# Patient Record
Sex: FEMALE | Employment: FULL TIME | URBAN - METROPOLITAN AREA
[De-identification: names, ages, dates, MRNs, and addresses within clinical notes are randomized per-mention and may not be internally consistent; named-entity substitution may affect disease eponyms.]

---

## 2024-06-18 ENCOUNTER — OFFICE VISIT (OUTPATIENT)
Dept: SPORTS MEDICINE | Age: 49
End: 2024-06-18

## 2024-06-18 ENCOUNTER — APPOINTMENT (OUTPATIENT)
Dept: SPORTS MEDICINE | Age: 49
End: 2024-06-18

## 2024-06-18 VITALS — HEART RATE: 75 BPM | SYSTOLIC BLOOD PRESSURE: 128 MMHG | DIASTOLIC BLOOD PRESSURE: 82 MMHG

## 2024-06-18 DIAGNOSIS — M76.892 PES ANSERINUS TENDONITIS OF LEFT LOWER EXTREMITY: Primary | ICD-10-CM

## 2024-06-18 PROCEDURE — 99204 OFFICE O/P NEW MOD 45 MIN: CPT | Performed by: FAMILY MEDICINE

## 2024-06-18 RX ORDER — NAPROXEN 500 MG/1
TABLET ORAL
Qty: 60 TABLET | Refills: 0 | Status: SHIPPED | OUTPATIENT
Start: 2024-06-18

## 2024-06-18 RX ORDER — ESTRADIOL 0.03 MG/D
1 FILM, EXTENDED RELEASE TRANSDERMAL
COMMUNITY

## 2024-06-18 ASSESSMENT — ENCOUNTER SYMPTOMS
NAUSEA: 0
CHILLS: 0
DIZZINESS: 0
CONFUSION: 0
COLOR CHANGE: 0
FEVER: 0
STRIDOR: 0
DIARRHEA: 0
SPEECH DIFFICULTY: 0
ABDOMINAL PAIN: 0
VOMITING: 0
CHEST TIGHTNESS: 0
AGITATION: 0
TROUBLE SWALLOWING: 0
FACIAL ASYMMETRY: 0
EYE DISCHARGE: 0
EYE REDNESS: 0
SHORTNESS OF BREATH: 0

## 2024-06-20 ENCOUNTER — EXTERNAL RECORD (OUTPATIENT)
Dept: HEALTH INFORMATION MANAGEMENT | Facility: OTHER | Age: 49
End: 2024-06-20

## 2024-09-01 ENCOUNTER — HOSPITAL ENCOUNTER (EMERGENCY)
Facility: HOSPITAL | Age: 49
Discharge: HOME | End: 2024-09-01
Attending: EMERGENCY MEDICINE
Payer: COMMERCIAL

## 2024-09-01 VITALS
OXYGEN SATURATION: 100 % | RESPIRATION RATE: 11 BRPM | DIASTOLIC BLOOD PRESSURE: 80 MMHG | BODY MASS INDEX: 21.19 KG/M2 | SYSTOLIC BLOOD PRESSURE: 121 MMHG | WEIGHT: 135 LBS | HEART RATE: 62 BPM | TEMPERATURE: 98 F | HEIGHT: 67 IN

## 2024-09-01 DIAGNOSIS — Z91.030 BEE STING ALLERGY: Primary | ICD-10-CM

## 2024-09-01 LAB
ANION GAP SERPL CALC-SCNC: 8 MMOL/L (ref 0–18)
BASOPHILS # BLD AUTO: 0.02 X10(3) UL (ref 0–0.2)
BASOPHILS NFR BLD AUTO: 0.3 %
BUN BLD-MCNC: 10 MG/DL (ref 9–23)
BUN/CREAT SERPL: 11 (ref 10–20)
CALCIUM BLD-MCNC: 9.6 MG/DL (ref 8.7–10.4)
CHLORIDE SERPL-SCNC: 108 MMOL/L (ref 98–112)
CO2 SERPL-SCNC: 27 MMOL/L (ref 21–32)
CREAT BLD-MCNC: 0.91 MG/DL
DEPRECATED RDW RBC AUTO: 43.2 FL (ref 35.1–46.3)
EGFRCR SERPLBLD CKD-EPI 2021: 77 ML/MIN/1.73M2 (ref 60–?)
EOSINOPHIL # BLD AUTO: 0.05 X10(3) UL (ref 0–0.7)
EOSINOPHIL NFR BLD AUTO: 0.7 %
ERYTHROCYTE [DISTWIDTH] IN BLOOD BY AUTOMATED COUNT: 12.3 % (ref 11–15)
GLUCOSE BLD-MCNC: 106 MG/DL (ref 70–99)
HCT VFR BLD AUTO: 40.6 %
HGB BLD-MCNC: 13.6 G/DL
IMM GRANULOCYTES # BLD AUTO: 0.02 X10(3) UL (ref 0–1)
IMM GRANULOCYTES NFR BLD: 0.3 %
LYMPHOCYTES # BLD AUTO: 1.98 X10(3) UL (ref 1–4)
LYMPHOCYTES NFR BLD AUTO: 27.3 %
MCH RBC QN AUTO: 31.6 PG (ref 26–34)
MCHC RBC AUTO-ENTMCNC: 33.5 G/DL (ref 31–37)
MCV RBC AUTO: 94.2 FL
MONOCYTES # BLD AUTO: 0.5 X10(3) UL (ref 0.1–1)
MONOCYTES NFR BLD AUTO: 6.9 %
NEUTROPHILS # BLD AUTO: 4.68 X10 (3) UL (ref 1.5–7.7)
NEUTROPHILS # BLD AUTO: 4.68 X10(3) UL (ref 1.5–7.7)
NEUTROPHILS NFR BLD AUTO: 64.5 %
OSMOLALITY SERPL CALC.SUM OF ELEC: 295 MOSM/KG (ref 275–295)
PLATELET # BLD AUTO: 263 10(3)UL (ref 150–450)
POTASSIUM SERPL-SCNC: 3.7 MMOL/L (ref 3.5–5.1)
RBC # BLD AUTO: 4.31 X10(6)UL
SODIUM SERPL-SCNC: 143 MMOL/L (ref 136–145)
WBC # BLD AUTO: 7.3 X10(3) UL (ref 4–11)

## 2024-09-01 PROCEDURE — 99284 EMERGENCY DEPT VISIT MOD MDM: CPT

## 2024-09-01 PROCEDURE — 85025 COMPLETE CBC W/AUTO DIFF WBC: CPT | Performed by: EMERGENCY MEDICINE

## 2024-09-01 PROCEDURE — 80048 BASIC METABOLIC PNL TOTAL CA: CPT | Performed by: EMERGENCY MEDICINE

## 2024-09-01 PROCEDURE — 96374 THER/PROPH/DIAG INJ IV PUSH: CPT

## 2024-09-01 RX ORDER — EPINEPHRINE 0.3 MG/.3ML
0.3 INJECTION SUBCUTANEOUS
Qty: 1 EACH | Refills: 0 | Status: SHIPPED | OUTPATIENT
Start: 2024-09-01 | End: 2024-10-01

## 2024-09-01 RX ORDER — DIPHENHYDRAMINE HCL 25 MG
25 CAPSULE ORAL EVERY 6 HOURS PRN
Qty: 30 CAPSULE | Refills: 0 | Status: SHIPPED | OUTPATIENT
Start: 2024-09-01

## 2024-09-01 RX ORDER — METHYLPREDNISOLONE SODIUM SUCCINATE 125 MG/2ML
125 INJECTION, POWDER, LYOPHILIZED, FOR SOLUTION INTRAMUSCULAR; INTRAVENOUS ONCE
Status: COMPLETED | OUTPATIENT
Start: 2024-09-01 | End: 2024-09-01

## 2024-09-01 RX ORDER — PREDNISONE 20 MG/1
40 TABLET ORAL DAILY
Qty: 8 TABLET | Refills: 0 | Status: SHIPPED | OUTPATIENT
Start: 2024-09-01 | End: 2024-09-05

## 2024-09-01 RX ORDER — FAMOTIDINE 20 MG/1
20 TABLET, FILM COATED ORAL 2 TIMES DAILY PRN
Qty: 30 TABLET | Refills: 0 | Status: SHIPPED | OUTPATIENT
Start: 2024-09-01 | End: 2024-10-01

## 2024-09-01 RX ORDER — CETIRIZINE HYDROCHLORIDE 10 MG/1
10 TABLET ORAL ONCE
Status: COMPLETED | OUTPATIENT
Start: 2024-09-01 | End: 2024-09-01

## 2024-09-01 RX ORDER — CETIRIZINE HYDROCHLORIDE 10 MG/1
10 TABLET ORAL DAILY
Qty: 30 TABLET | Refills: 0 | Status: SHIPPED | OUTPATIENT
Start: 2024-09-01 | End: 2024-10-01

## 2024-09-01 NOTE — ED INITIAL ASSESSMENT (HPI)
S: Bee sting, pt believes, induced hives, swollen lips and pt states that he tongue feels thick and she was having trouble speaking. Took benadryl and pepcid at home.   A: Airway intact.

## 2024-09-01 NOTE — ED PROVIDER NOTES
Patient Seen in: [facility] Emergency Department    History     Chief Complaint   Patient presents with    Allergic Rxn Allergies       HPI    48-year-old female with no significant past medical history presents to the ED for ration of swelling of her tongue and lips after a bee sting on her right hand.  The sting occurred about an hour prior to ED arrival and she began feeling swelling of her lips and tongue prompting her to take 50 mg of Benadryl and 2 tabs of Pepcid just prior to ED arrival.  She denies any difficulty breathing at this time or significant itching.    History from Independent Source:       External Records Reviewed:     History reviewed. History reviewed. No pertinent past medical history.    History reviewed. No past surgical history on file.      Medications :  (Not in a hospital admission)       History reviewed. No pertinent family history.    Smoking Status:   Social History     Socioeconomic History    Marital status:        Constitutional and vital signs reviewed.      Social History and Family History elements reviewed from today, pertinent positives to the presenting problem noted.    Physical Exam     ED Triage Vitals   BP 09/01/24 1827 135/89   Pulse 09/01/24 1827 77   Resp 09/01/24 1827 20   Temp 09/01/24 1827 97.9 °F (36.6 °C)   Temp src 09/01/24 1827 Temporal   SpO2 09/01/24 1827 96 %   O2 Device 09/01/24 1830 None (Room air)       Physical Exam   Constitutional: AAOx3, well nourished, NAD  HEENT: Normocephalic, PERRLA, MMM, edema of upper and lower lips.  No significant intraoral edema  CV: s1s2+, RRR, no m/r/g, normal distal pulses  Pulmonary/Chest: CTA b/l with no rales, wheezes.  No chest wall tenderness  Abdominal: Nontender.  Nondistended. Soft. Bowel sounds are normal.   Neck/Back:   :   Musculoskeletal: Normal range of motion. No deformity.  Right hand with erythema and edema noted between the second and third MCP joints going into the dorsum of the  hand  Neurological: Awake, alert. Normal reflexes. No cranial nerve deficit.    Skin: Skin is warm and dry.  Urticarial rash noted on extremities  Psychiatric:      All measures to prevent infection transmission during my interaction with the patient were taken. The patient was already wearing a droplet mask on my arrival to the room. Personal protective equipment was worn throughout the duration of the exam.      ED Course        Labs Reviewed   BASIC METABOLIC PANEL (8) - Abnormal; Notable for the following components:       Result Value    Glucose 106 (*)     All other components within normal limits   CBC WITH DIFFERENTIAL WITH PLATELET   RAINBOW DRAW LAVENDER   RAINBOW DRAW LIGHT GREEN     My Independent Interpretation of EKG (if performed):     Monitor Interpretation:   normal sinus rhythm as interpreted by me.      Imaging Results Available and Reviewed while in ED: No results found.  ED Medications Administered:   Medications   cetirizine (ZyrTEC) tab 10 mg (has no administration in time range)   methylPREDNISolone sodium succinate (Solu-MEDROL) injection 125 mg (125 mg Intravenous Given 9/1/24 1835)             MDM     Vitals:    09/01/24 1827 09/01/24 1830 09/01/24 1831 09/01/24 1845   BP: 135/89 (!) 118/91  113/71   Pulse: 77 70  70   Resp: 20 17  20   Temp: 97.9 °F (36.6 °C)      TempSrc: Temporal      SpO2: 96% 100%  100%   Weight:   61.2 kg    Height:   170.2 cm (5' 7\")      *I personally reviewed and interpreted all ED vitals.    Independent Interpretation of Studies:     Social Determinants of Health:     Procedures:      Differential/MDM/Shared Decision Making: Differential Diagnosis includes anaphylaxis, insect sting allergy, contact dermatitis, others.           Patient likely develops anaphylaxis reaction to bee sting.  She has had improvement in symptoms after the Benadryl and Pepcid that she had taken prior to ED arrival as well as Solu-Medrol and Zyrtec given in the ED.  Discussed case with  patient and she is comfortable with discharge on steroids for home as well as antihistamines.  Will also discharge with EpiPen.  Patient is comfortable with discharge plan.      Condition upon leaving the department: Stable    Disposition and Plan     Clinical Impression:  1. Bee sting allergy        Disposition:  Discharge    Follow-up:  Kacie uCriel  [address] [place] [address]  [phone]    Call in 2 day(s)        Medications Prescribed:  Current Discharge Medication List        START taking these medications    Details   predniSONE 20 MG Oral Tab Take 2 tablets (40 mg total) by mouth daily for 4 days.  Qty: 8 tablet, Refills: 0      cetirizine 10 MG Oral Tab Take 1 tablet (10 mg total) by mouth daily.  Qty: 30 tablet, Refills: 0      diphenhydrAMINE 25 MG Oral Cap Take 1 capsule (25 mg total) by mouth every 6 (six) hours as needed for Itching or Allergies.  Qty: 30 capsule, Refills: 0      famotidine (PEPCID) 20 MG Oral Tab Take 1 tablet (20 mg total) by mouth 2 (two) times daily as needed for Heartburn.  Qty: 30 tablet, Refills: 0      EPINEPHrine 0.3 MG/0.3ML Injection Solution Auto-injector Inject 0.3 mL (1 each total) into the muscle daily as needed.  Qty: 1 each, Refills: 0

## 2024-10-31 ENCOUNTER — E-ADVICE (OUTPATIENT)
Dept: SPORTS MEDICINE | Age: 49
End: 2024-10-31

## 2024-10-31 DIAGNOSIS — M76.892 PES ANSERINUS TENDONITIS OF LEFT LOWER EXTREMITY: Primary | ICD-10-CM

## 2024-11-11 ENCOUNTER — E-ADVICE (OUTPATIENT)
Dept: SPORTS MEDICINE | Age: 49
End: 2024-11-11

## 2024-11-13 ENCOUNTER — OFFICE VISIT (OUTPATIENT)
Dept: SPORTS MEDICINE | Age: 49
End: 2024-11-13

## 2024-11-13 VITALS — SYSTOLIC BLOOD PRESSURE: 122 MMHG | DIASTOLIC BLOOD PRESSURE: 83 MMHG | HEART RATE: 87 BPM

## 2024-11-13 DIAGNOSIS — G89.29 CHRONIC PAIN OF LEFT KNEE: ICD-10-CM

## 2024-11-13 DIAGNOSIS — M76.892 PES ANSERINUS TENDONITIS OF LEFT LOWER EXTREMITY: Primary | ICD-10-CM

## 2024-11-13 DIAGNOSIS — M25.562 CHRONIC PAIN OF LEFT KNEE: ICD-10-CM

## 2024-11-13 RX ORDER — TRIAMCINOLONE ACETONIDE 40 MG/ML
40 INJECTION, SUSPENSION INTRA-ARTICULAR; INTRAMUSCULAR
Status: COMPLETED | OUTPATIENT
Start: 2024-11-13 | End: 2024-11-13

## 2024-11-13 RX ORDER — LIDOCAINE HYDROCHLORIDE 10 MG/ML
4 INJECTION, SOLUTION INFILTRATION; PERINEURAL
Status: COMPLETED | OUTPATIENT
Start: 2024-11-13 | End: 2024-11-13

## 2024-11-13 RX ADMIN — LIDOCAINE HYDROCHLORIDE 4 ML: 10 INJECTION, SOLUTION INFILTRATION; PERINEURAL at 13:35

## 2024-11-13 RX ADMIN — TRIAMCINOLONE ACETONIDE 40 MG: 40 INJECTION, SUSPENSION INTRA-ARTICULAR; INTRAMUSCULAR at 13:35

## 2024-11-13 ASSESSMENT — ENCOUNTER SYMPTOMS
STRIDOR: 0
EYE REDNESS: 0
SPEECH DIFFICULTY: 0
CONFUSION: 0
VOMITING: 0
DIZZINESS: 0
FEVER: 0
TROUBLE SWALLOWING: 0
DIARRHEA: 0
CHILLS: 0
CHEST TIGHTNESS: 0
COLOR CHANGE: 0
SHORTNESS OF BREATH: 0
NAUSEA: 0
EYE DISCHARGE: 0
ABDOMINAL PAIN: 0
AGITATION: 0
FACIAL ASYMMETRY: 0

## 2025-06-24 ENCOUNTER — APPOINTMENT (OUTPATIENT)
Dept: SPORTS MEDICINE | Age: 50
End: 2025-06-24

## 2025-06-24 DIAGNOSIS — S29.011A STRAIN OF RIGHT PECTORALIS MUSCLE, INITIAL ENCOUNTER: ICD-10-CM

## 2025-06-24 DIAGNOSIS — M70.51 PES ANSERINUS BURSITIS OF RIGHT KNEE: Primary | ICD-10-CM

## 2025-06-24 PROCEDURE — 20610 DRAIN/INJ JOINT/BURSA W/O US: CPT | Performed by: FAMILY MEDICINE

## 2025-06-24 PROCEDURE — 99213 OFFICE O/P EST LOW 20 MIN: CPT | Performed by: FAMILY MEDICINE

## 2025-06-24 RX ORDER — TRIAMCINOLONE ACETONIDE 40 MG/ML
40 INJECTION, SUSPENSION INTRA-ARTICULAR; INTRAMUSCULAR
Status: COMPLETED | OUTPATIENT
Start: 2025-06-24 | End: 2025-06-24

## 2025-06-24 RX ORDER — LIDOCAINE HYDROCHLORIDE 10 MG/ML
4 INJECTION, SOLUTION INFILTRATION; PERINEURAL
Status: COMPLETED | OUTPATIENT
Start: 2025-06-24 | End: 2025-06-24

## 2025-06-24 RX ORDER — TRETINOIN 0.05 G/100G
GEL TOPICAL NIGHTLY
Qty: 45 G | Refills: 1 | Status: SHIPPED | OUTPATIENT
Start: 2025-06-24

## 2025-06-24 RX ADMIN — LIDOCAINE HYDROCHLORIDE 4 ML: 10 INJECTION, SOLUTION INFILTRATION; PERINEURAL at 14:00

## 2025-06-24 RX ADMIN — TRIAMCINOLONE ACETONIDE 40 MG: 40 INJECTION, SUSPENSION INTRA-ARTICULAR; INTRAMUSCULAR at 14:00

## 2025-06-24 ASSESSMENT — ENCOUNTER SYMPTOMS
EYE DISCHARGE: 0
EYE REDNESS: 0
FACIAL ASYMMETRY: 0
DIZZINESS: 0
SHORTNESS OF BREATH: 0
CHEST TIGHTNESS: 0
AGITATION: 0
TROUBLE SWALLOWING: 0
NAUSEA: 0
CONFUSION: 0
FEVER: 0
SPEECH DIFFICULTY: 0
VOMITING: 0
ABDOMINAL PAIN: 0
COLOR CHANGE: 0
STRIDOR: 0
DIARRHEA: 0
CHILLS: 0